# Patient Record
(demographics unavailable — no encounter records)

---

## 2024-10-11 NOTE — HISTORY OF PRESENT ILLNESS
[FreeTextEntry1] : Charo is a 35-year-old female who presents today for follow up for chronic exacerbated right clavicular pain particularly localized around the level of the AC joint.  The symptoms are bothersome and are affecting her ADLs.  She describes some relief with her injection last visit, however some symptoms persist.

## 2024-10-11 NOTE — PHYSICAL EXAM
[de-identified] : On examination of the right shoulder, there is tenderness at the level of the AC joint however no swelling or signs of infection. She tolerates range of motion well with minimal discomfort. [de-identified] : 3 views of right clavicle were reviewed today in my office and were seen by me and discussed with the patient.  These [show findings consistent with AC arthropathy]

## 2024-10-11 NOTE — ASSESSMENT
[FreeTextEntry1] : ASSESSMENT: The patient comes in today for follow up for chronic exacerbated right clavicular pain particularly localized around the level of the AC joint. She describes some relief with her injection last visit, however some symptoms persist. 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  Treatment modalities were discussed, the patient elects for a repeat injection as prior injection provided symptomatic relief.  I also recommend physical therapy for her condition.  Activity modifications were discussed as well.   The patient was adequately and thoroughly informed of my assessment of their current condition(s).  - This may diminish bodily function for the extremity.  We discussed prognosis, treatment modalities including operative and nonoperative options for the above diagnostic assessment. As always, 2nd opinion is always provided as an option. For this, when accessible, I was able to review other physicians note(s) including reviewing other tests, imaging results as well as personally view these results for my own interpretation.      Injection:   The risks and benefits of a steroid injection were discussed in detail. The risks include but are not limited to: pain, infection, swelling, flare response, bleeding, subcutaneous fat atrophy, skin depigmentation and/or elevation of blood sugar. The risk of incomplete resolution of symptoms, recurrence and additional intervention was reviewed and considered by the patient.  The patient agreed to proceed and under a sterile prep, I injected 1 unit (6mg) into 1 cc of a combination of Celestone and Lidocaine into the [right acromioclavicular joint]. The patient tolerated the injection well.   The patient was adequately and thoroughly informed of my assessment of their current condition(s).   DISCUSSION: 1.  Injection as above.  PT prescription provided.  Activity modifications. HEP. 2. [x] 3. [x]

## 2024-12-26 NOTE — PHYSICAL EXAM
[FreeTextEntry3] : Vascular Exam: DP 2/4 bilaterally, PT 2/4 bilaterally, Temperature gradient normal, Capillary return instant x 10 [de-identified] : Orthopedic Exam:  Patient presents with a hypermobile varus foot type.  As she bears weight her arch flattens.  Mild bunion and hammer toe deformities noted left greater than right.  Prominent metatarsal heads plantarly.  Discomfort upon palpation 2nd metatarsal head left. X-ray: 2 views demonstrate plantar heel spur left greater than right.  Bunion deformity.  Hammer toe deformity.  Increased soft tissue density plantar to the metatarsal heads left greater than right consistent with inflamation. [FreeTextEntry1] : Neurological Exam: Sharp / Dull - L: WNL. Sharp / Dull - R: WNL. Light Touch/pressure - L: WNL. Light Touch/pressure - R: WNL. Hot/cold - L: WNL. Hot/cold - R: WNL. Vibratory - L: WNL. Vibratory - R: WNL.

## 2024-12-26 NOTE — PHYSICAL EXAM
[FreeTextEntry3] : Vascular Exam: DP 2/4 bilaterally, PT 2/4 bilaterally, Temperature gradient normal, Capillary return instant x 10 [de-identified] : Orthopedic Exam:  Patient presents with a hypermobile varus foot type.  As she bears weight her arch flattens.  Mild bunion and hammer toe deformities noted left greater than right.  Prominent metatarsal heads plantarly.  Discomfort upon palpation 2nd metatarsal head left. X-ray: 2 views demonstrate plantar heel spur left greater than right.  Bunion deformity.  Hammer toe deformity.  Increased soft tissue density plantar to the metatarsal heads left greater than right consistent with inflamation. [FreeTextEntry1] : Neurological Exam: Sharp / Dull - L: WNL. Sharp / Dull - R: WNL. Light Touch/pressure - L: WNL. Light Touch/pressure - R: WNL. Hot/cold - L: WNL. Hot/cold - R: WNL. Vibratory - L: WNL. Vibratory - R: WNL.

## 2024-12-26 NOTE — PROCEDURE
[FreeTextEntry1] : Plan:  Examination.  (Hb=07904).  We had a lengthy discussion concerning the patient's condition.  We reviewed her x-rays. (Wy=[id number]).  We offered her an NSAID which she refused.  Surgical debridement of the hyperkeratosis revealed pink, healthy skin.  We discussed the benefits of custom foot orthosis. Patient to return: as needed.

## 2024-12-26 NOTE — PHYSICAL EXAM
[FreeTextEntry3] : Vascular Exam: DP 2/4 bilaterally, PT 2/4 bilaterally, Temperature gradient normal, Capillary return instant x 10 [de-identified] : Orthopedic Exam:  Patient presents with a hypermobile varus foot type.  As she bears weight her arch flattens.  Mild bunion and hammer toe deformities noted left greater than right.  Prominent metatarsal heads plantarly.  Discomfort upon palpation 2nd metatarsal head left. X-ray: 2 views demonstrate plantar heel spur left greater than right.  Bunion deformity.  Hammer toe deformity.  Increased soft tissue density plantar to the metatarsal heads left greater than right consistent with inflamation. [FreeTextEntry1] : Neurological Exam: Sharp / Dull - L: WNL. Sharp / Dull - R: WNL. Light Touch/pressure - L: WNL. Light Touch/pressure - R: WNL. Hot/cold - L: WNL. Hot/cold - R: WNL. Vibratory - L: WNL. Vibratory - R: WNL.

## 2024-12-26 NOTE — HISTORY OF PRESENT ILLNESS
[FreeTextEntry1] : Location/Duration: Charo is a 36-year-old female who presents to our office complaining of pain in both feet.  The left hurts more.  She gets ocassional pain over the bump on the inside of her foot, but recently started getting pain in the ball more often.  She feels a hard spot there.  Quality/Severity of Pain: The patient states that the pain is worsening.  Pain Scale Stated: 3/10.  Timing: The pain gets worse the more she is on her feet. Context: The patient has had no previous treatment. The patient states that there is not any injury associated with the problem. Modifying Factors: She indicates no modifying factors.

## 2024-12-26 NOTE — PROCEDURE
[FreeTextEntry1] : Plan:  Examination.  (Wj=[id number]).  We had a lengthy discussion concerning the patient's condition.  We reviewed her x-rays. (St=[id number]).  We offered her an NSAID which she refused.  Surgical debridement of the hyperkeratosis revealed pink, healthy skin.  We discussed the benefits of custom foot orthosis. Patient to return: as needed.

## 2024-12-26 NOTE — PROCEDURE
[FreeTextEntry1] : Plan:  Examination.  (Tf=[id number]).  We had a lengthy discussion concerning the patient's condition.  We reviewed her x-rays. (Fc=[id number]).  We offered her an NSAID which she refused.  Surgical debridement of the hyperkeratosis revealed pink, healthy skin.  We discussed the benefits of custom foot orthosis. Patient to return: as needed.

## 2024-12-30 NOTE — PHYSICAL EXAM
[de-identified] : Bilateral hands -Digits warm well-perfused -Denies numbness or tingling on the radial or ulnar border of digits distally in the median, ulnar and radial nerve distributions -Able to make a full composite fist that difficulty. -No tenderness of the A1 pulley of all digits, no clicking locking or catching -No subluxation of sagittal bands or tenderness of the sagittal bands -Maximum ttp of all digits at PIP joint -Left index finger has slight hyperextension deformity at PIP joint [de-identified] : Three-view x-ray of bilateral hand including AP lateral oblique were taken and personally viewed these demonstrate no fracture dislocation there is maintained joint space, there is no soft tissue lesions.

## 2024-12-30 NOTE — HISTORY OF PRESENT ILLNESS
[FreeTextEntry1] : 36-year-old female presenting for evaluation of generalized bilateral hand pain, most focal at her PIP joints of all digits, most painful of her left index finger.  This been ongoing for several weeks to months.  There is no history of injury or trauma.  She has seen a hand provider several years ago in the past.  She also  shoulder pain as well.  She reports generalized pain all the time.  She denies history of rheumatologic disorder.

## 2024-12-30 NOTE — ASSESSMENT
[FreeTextEntry1] : - 36-year-old female, generalized hand pain bilateral, focal at the PIP joints.  She denies history of any rheumatologic disorder or rheumatologic disorder in the family however she is not sure.  Given bilateral hand involvement multiple joints we have sent screening labs including CRP, ESR AMADOR and rheumatoid factor as this may be rheumatologic in nature -Believe she will benefit from hand therapy for strengthening stretching and pain modalities, hand therapy consult placed today. -She will follow-up after trialing hand therapy as well as obtaining the screening labs and we will make a plan going forward.

## 2025-02-03 NOTE — HISTORY OF PRESENT ILLNESS
[FreeTextEntry1] : Anti-obesity medications:  Obesity medication side effects: Bariatric surgery history: none  Obesity co-morbidities: dyslipidemia  Co-morbidities improved or resolved: highest adult weight: 330 last weight: current weight: 310 other pmhx:  labs: reviewed  cholecystectomy (a few weeks)  35 yo female with class 3 obesity presents for weight management follow up.  Comorbid conditions include dyslipidemia. Prior use of Ozempic but lost access to this (it did help with 60 lbs weight loss).  Written for Vyvanse by psychiatrist to help with BED, she tried this temporarily and this didn't really do anything for her/It made her anxiety worse.  Her insurance changed this month and it covers Zepbound with PA and she would like to start this    No contraindications to GLP therapy: history of pancreatitis, personal or family history of medullary thyroid cancer or MEN2 No history of severe GI disease, no h/o diabetic retinopathy, type 1 DM, concomitant use of insulin secretagogues or insulin Patient is not: pregnant or planning pregnancy, breastfeeding

## 2025-02-03 NOTE — REASON FOR VISIT
[Home] : at home, [unfilled] , at the time of the visit. [Other Location: e.g. Home (Enter Location, City,State)___] : at [unfilled] [Patient] : the patient [This encounter was initiated by telehealth (audio with video) and converted to telephone (audio only) due to technical difficulties.] : This encounter was initiated by telehealth (audio with video) and converted to telephone (audio only) due to technical difficulties. [Follow-Up] : a follow-up visit

## 2025-02-03 NOTE — ASSESSMENT
[FreeTextEntry1] : 35 yo female requires medical weight loss therapy due to weight history and the positive effects weight loss can have on comorbid conditions of dyslipidemia:   - She agrees to start Zepbound 2.5.  Risks and benefits discussion, including the possibility that it could make her BED worse.  - for her constipation she will monitor and manage with medications PRN - She will continue to follow with her psychiatrist  - she is not a candidate for vyvanse, contrave or qsymia due to h/o anxiety  - Follow up 3 weeks

## 2025-03-03 NOTE — HISTORY OF PRESENT ILLNESS
[FreeTextEntry1] : Anti-obesity medications:  Obesity medication side effects: Bariatric surgery history: none  Obesity co-morbidities: dyslipidemia  Co-morbidities improved or resolved: highest adult weight: 330 last weight: current weight: 310 other pmhx:  labs: reviewed  cholecystectomy (a few weeks)  37 yo female with class 3 obesity presents for weight management follow up.  Comorbid conditions include dyslipidemia. Prior use of Ozempic but lost access to this (it did help with 60 lbs weight loss).  Written for Vyvanse by psychiatrist to help with BED, she tried this temporarily and this didn't really do anything for her/It made her anxiety worse.  Her insurance changed this month and it covers Zepbound with PA and she would like to start this    No contraindications to GLP therapy: history of pancreatitis, personal or family history of medullary thyroid cancer or MEN2 No history of severe GI disease, no h/o diabetic retinopathy, type 1 DM, concomitant use of insulin secretagogues or insulin Patient is not: pregnant or planning pregnancy, breastfeeding

## 2025-03-03 NOTE — ASSESSMENT
[FreeTextEntry1] : 37 yo female with class 3 obesity requires medical weight loss therapy due to weight history and the positive effects weight loss can have on comorbid conditions of dyslipidemia:   - class 3 obesity: She agrees to start Zepbound 2.5.  Risks and benefits discussion, including the possibility that it could make her BED worse.  - for her constipation she will monitor and manage with medications PRN - She will continue to follow with her psychiatrist  - she is not a candidate for vyvanse, contrave or qsymia due to h/o anxiety  - Follow up 3 weeks

## 2025-03-07 NOTE — HISTORY OF PRESENT ILLNESS
[FreeTextEntry1] : Anti-obesity medications:  zepbound (Sw [facility],  3/1/25) Obesity medication side effects: Bariatric surgery history: none  Obesity co-morbidities: dyslipidemia  Co-morbidities improved or resolved: highest adult weight: 330 other pmhx:  labs: reviewed  cholecystectomy (a few weeks)  35 yo female with class 3 obesity presents for weight management follow up.  Comorbid conditions include dyslipidemia.  She also has a history of BED and follows with psychiatry.  She has approval for Zepbound but is only on it 1 week (feels crappy because she's sick).  It's hard to say if it's working well for her, mild nausea once or twice.  She had anxiety starting it so she delayed and she also had a death in the family and was away.    notes:  Prior use of Ozempic but lost access to this (it did help with 60 lbs weight loss).  Written for Vyvanse by psychiatrist to help with BED, she tried this temporarily and this didn't really do anything for her/It made her anxiety worse.  Her insurance changed this month and it covers Zepbound with PA and she would like to start this    No contraindications to GLP therapy: history of pancreatitis, personal or family history of medullary thyroid cancer or MEN2 No history of severe GI disease, no h/o diabetic retinopathy, type 1 DM, concomitant use of insulin secretagogues or insulin Patient is not: pregnant or planning pregnancy, breastfeeding

## 2025-03-07 NOTE — ASSESSMENT
[FreeTextEntry1] : 35 yo female with class 3 obesity requires medical weight loss therapy due to weight history and the positive effects weight loss can have on comorbid conditions of dyslipidemia:   + class 3 obesity:  She agrees to increase to Zep 5mg (assuming all continues to go well on the 2.5)   - she has zofran to use prn but didn't require it - for her constipation she will monitor and manage with medications PRN  + dyslipidemia: can monitor labs in the future  - She will continue to follow with her psychiatrist  - she is not a candidate for vyvanse, contrave or qsymia due to h/o anxiety  - Follow up 6 weeks

## 2025-05-08 NOTE — HISTORY OF PRESENT ILLNESS
[de-identified] :  Ms. PIZARRO is a 36 year old morbidly obese woman, 5ft 4.5 inch, 327 lbs, BMI 55.2.  The patient presents requesting weight loss surgery. She has been more than 75 lb. overweight for greater than 5 years.   The patient has tried numerous weight loss programs including self-directed and physician supervised diets and self-directed exercise programs. She has lost greater than 10 pounds on more than one occasion, however, has experienced weight regain despite her best efforts with healthy diet and exercise.  The patient's history includes cholecystectomy   denies reflux denies smoking

## 2025-05-08 NOTE — ASSESSMENT
[FreeTextEntry1] :  Ms. PIZARRO is a 36 year old woman with morbid obesity who is unable to lose weight and lower her risk of co-morbid conditions with medical management including diet and exercise. She wishes to proceed with planning for bariatric surgery.

## 2025-05-08 NOTE — PLAN
[FreeTextEntry1] : The risks, benefits and alternatives of laparoscopic/robotic gastric bypass and sleeve gastrectomy were discussed at length and all questions were answered. The patient appears to understand and wishes to proceed. Plan for robotic sleeve gastrectomy.   The patient was given the following instructions:  1.   She needs a complete medical evaluation including cardiac evaluation   2.   She needs an upper endoscopy. - Dr. Espinosa will preform   3.   She needs dietary and psychological evaluations.  The patient clearly understood that surgery would only be scheduled if there are no medical or psychiatric contraindications, and a second office visit is required.  The risks/benefits of weight loss surgery vs nonoperative management were discussed. We discussed the criteria used for eligibility. The patient understands.   I, Dr. Espinosa, personally performed the evaluation and management (E/M) services for this established patient who presents today with (a) new problem(s)/exacerbation of (an) existing condition(s).  That E/M includes conducting the clinically appropriate interval history &/or exam, assessing all new/exacerbated conditions, and establishing a new plan of care.  Today, my CRISTY, Caryn Valdes, was here to observe in the visit & follow plan of care established by me.

## 2025-06-01 NOTE — HISTORY OF PRESENT ILLNESS
[Poor Choices] : poor choices [Large Portions] : large portions [Emotional Eating] : emotional eating [Decrease Activity] : decrease activity [Mindless Eating] : mindless eating [de-identified] : Pt's motivation for surgery is to improve her health, overall quality of life and prevent obesity related comorbidities.  Per pt, her highest weight is current weight of 330 lbs and her lowest weight was 150 lbs 8 years ago.  Her stated goal weight is 200 lbs and she expresses intent to make behavioral and dietary changes towards obtaining optimal results. [de-identified] : discussions with surgeon; online research; discussions with others who've had bariatric surgery.  [de-identified] : sleeve gastrectomy   [de-identified] : Ms. Stearns denied a history of anorexia or bulimia. She endorsed engaging in binge eating episodes once a week, during which she eats a very large amount of food, usually pastries, within a 5-10 minute period. She stated that this caused her to gain 130 lbs in 1 year.   [de-identified] : A current typical day of eating is reported as follows: B: 8am [employer] coffee, lemon loaf slice, donut, or croissant L: 12-1pm Rice and chicken from [employer], sometimes with a brownie D: 7-8 pm Chicken, veggie, starch S: 11 pm snack cakes, cookies, oreos  Drinks: Water, diet soda [de-identified] : counting calories, reducing calories, ozempic, exercising, zepbound, high protein diet. Despite multiple attempts at diet and exercise modifications, patient reports inability to maintain weight loss.

## 2025-06-01 NOTE — REASON FOR VISIT
[Other Location: e.g. School (Enter Location, City,State)___] : at [unfilled], at the time of the visit. [Other Location: e.g. Home (Enter Location, City,State)___] : at [unfilled] [Telehealth (audio & video)] : This visit was provided via telehealth using real-time 2-way audio visual technology. [Verbal consent obtained from patient] : the patient, [unfilled] [Initial Consult] : an initial consult for [Referring By:  ___] : ~Max Ln~ was referred for psychological evaluation by Dr. KAUR [Attempted Weight Loss] : attempted weight loss [Commitment to Modified Lifestyle] : commitment to a modified lifestyle pre and post surgery [Difficulties with Diet Compliance] : difficulties with diet compliance  [Expectations of Outcome] : expectations of outcome [Motivation for Selecting Surgery] : motivation for selecting surgery [Strength of Social Support System] : strength of social support system [Patient Understands Data May be Shared] : patient understands that the information discussed during the evaluation would be shared with referring provider and possibly with ~his/her~ insurance provider [de-identified] : for evaluation of psychological appropriateness for bariatric surgery      [de-identified] : confidentiality and its limits were discussed

## 2025-06-01 NOTE — SOCIAL HISTORY
[Never ] : never  [Committed Relationship] : in a committed relationship [FreeTextEntry1] : Lives with her boyfriend [FreeTextEntry2] : Employed full time as a phlebotomist [FreeTextEntry4] : Some college [FreeTextEntry5] : Pt reports experiencing family members with alcoholism and mental health issues that resulted in traumatic experiences.

## 2025-06-01 NOTE — DISCUSSION/SUMMARY
[FreeTextEntry1] : Due to time limitations, this evaluation was not able to be completed and a second session was scheduled for 6/12.

## 2025-06-01 NOTE — HISTORY OF PRESENT ILLNESS
[Poor Choices] : poor choices [Large Portions] : large portions [Emotional Eating] : emotional eating [Decrease Activity] : decrease activity [Mindless Eating] : mindless eating [de-identified] : Pt's motivation for surgery is to improve her health, overall quality of life and prevent obesity related comorbidities.  Per pt, her highest weight is current weight of 330 lbs and her lowest weight was 150 lbs 8 years ago.  Her stated goal weight is 200 lbs and she expresses intent to make behavioral and dietary changes towards obtaining optimal results. [de-identified] : discussions with surgeon; online research; discussions with others who've had bariatric surgery.  [de-identified] : sleeve gastrectomy   [de-identified] : Ms. Stearns denied a history of anorexia or bulimia. She endorsed engaging in binge eating episodes once a week, during which she eats a very large amount of food, usually pastries, within a 5-10 minute period. She stated that this caused her to gain 130 lbs in 1 year.   [de-identified] : A current typical day of eating is reported as follows: B: 8am [employer] coffee, lemon loaf slice, donut, or croissant L: 12-1pm Rice and chicken from [employer], sometimes with a brownie D: 7-8 pm Chicken, veggie, starch S: 11 pm snack cakes, cookies, oreos  Drinks: Water, diet soda [de-identified] : counting calories, reducing calories, ozempic, exercising, zepbound, high protein diet. Despite multiple attempts at diet and exercise modifications, patient reports inability to maintain weight loss.

## 2025-06-01 NOTE — REASON FOR VISIT
[Other Location: e.g. School (Enter Location, City,State)___] : at [unfilled], at the time of the visit. [Other Location: e.g. Home (Enter Location, City,State)___] : at [unfilled] [Telehealth (audio & video)] : This visit was provided via telehealth using real-time 2-way audio visual technology. [Verbal consent obtained from patient] : the patient, [unfilled] [Initial Consult] : an initial consult for [Referring By:  ___] : ~Max Ln~ was referred for psychological evaluation by Dr. KAUR [Attempted Weight Loss] : attempted weight loss [Commitment to Modified Lifestyle] : commitment to a modified lifestyle pre and post surgery [Difficulties with Diet Compliance] : difficulties with diet compliance  [Expectations of Outcome] : expectations of outcome [Motivation for Selecting Surgery] : motivation for selecting surgery [Strength of Social Support System] : strength of social support system [Patient Understands Data May be Shared] : patient understands that the information discussed during the evaluation would be shared with referring provider and possibly with ~his/her~ insurance provider [de-identified] : for evaluation of psychological appropriateness for bariatric surgery      [de-identified] : confidentiality and its limits were discussed

## 2025-07-01 NOTE — HISTORY OF PRESENT ILLNESS
[FreeTextEntry1] : annual well check and to establish care w/ a new PM [de-identified] : -PMH: Morbid Obesity, Anxiety w/ Panic DO -SH: Non-smoker. Occasional EtOH use.   LUZ is a 36 year F whom is here today for an annual well check and to establish care w/ a new PMD Today, pt reports feeling well  Specialists Involved: -Psychiatry -OBGYN: Dr. Gavin  -Cardio: Dr. Whitehead [phone]  -Vaccines: Needs Flu, TDap states she has a record of last Tdap at home -Pap Smear: 12/2022 -FH of Colon, breast or ovarian CA: Maternal GM w/ Breast CA @ 30.   -Anxiety, Panic DO: Managed on Fluoxetine 60mg qd per Psychiatry  - Morbid obesity: States longstanding history of difficulties with weight.  Previously did very well on Ozempic but since being off medication has regained all lost weight.  States previously tolerated medication well

## 2025-07-01 NOTE — HISTORY OF PRESENT ILLNESS
[FreeTextEntry1] : annual well check and to establish care w/ a new PM [de-identified] : -PMH: Morbid Obesity, Anxiety w/ Panic DO -SH: Non-smoker. Occasional EtOH use.   LUZ is a 36 year F whom is here today for an annual well check and to establish care w/ a new PMD Today, pt reports feeling well  Specialists Involved: -Psychiatry -OBGYN: Dr. Gavin  -Cardio: Dr. Whitehead [phone]  -Vaccines: Needs Flu, TDap states she has a record of last Tdap at home -Pap Smear: 12/2022 -FH of Colon, breast or ovarian CA: Maternal GM w/ Breast CA @ 30.   -Anxiety, Panic DO: Managed on Fluoxetine 60mg qd per Psychiatry  - Morbid obesity: States longstanding history of difficulties with weight.  Previously did very well on Ozempic but since being off medication has regained all lost weight.  States previously tolerated medication well

## 2025-07-01 NOTE — HEALTH RISK ASSESSMENT
[Excellent] : ~his/her~  mood as  excellent [No] : In the past 12 months have you used drugs other than those required for medical reasons? No [0] : 2) Feeling down, depressed, or hopeless: Not at all (0) [PHQ-2 Negative - No further assessment needed] : PHQ-2 Negative - No further assessment needed [Patient reported PAP Smear was normal] : Patient reported PAP Smear was normal [HIV test declined] : HIV test declined [Hepatitis C test declined] : Hepatitis C test declined [Employed] : employed [Single] : single [Reviewed no changes] : Reviewed, no changes [Never] : Never [Audit-CScore] : 0 [[id number]] : 0 [Change in mental status noted] : No change in mental status noted [PapSmearDate] : 12/22 [AdvancecareDate] : 06/25

## 2025-07-01 NOTE — HEALTH RISK ASSESSMENT
[Excellent] : ~his/her~  mood as  excellent [No] : In the past 12 months have you used drugs other than those required for medical reasons? No [0] : 2) Feeling down, depressed, or hopeless: Not at all (0) [PHQ-2 Negative - No further assessment needed] : PHQ-2 Negative - No further assessment needed [Patient reported PAP Smear was normal] : Patient reported PAP Smear was normal [HIV test declined] : HIV test declined [Hepatitis C test declined] : Hepatitis C test declined [Employed] : employed [Single] : single [Reviewed no changes] : Reviewed, no changes [Never] : Never [Audit-CScore] : 0 [[id number]] : 0 [Change in mental status noted] : No change in mental status noted [PapSmearDate] : 12/22 [AdvancecareDate] : 06/25

## 2025-07-01 NOTE — ASSESSMENT
[Vaccines Reviewed] : Immunizations reviewed today. Please see immunization details in the vaccine log within the immunization flowsheet.  [FreeTextEntry1] : Slightly elevated diastolic BP and elevated BMI consistent with morbid obesity Dietary/lifestyle modifications with increased exercise and goal of weight loss encouraged Will start patient on Zepbound 2.5 mg q. weekly subcutaneously.  She will return to office in 2 months sooner if needed.  Most common side effects discussed and should she have any undesired adverse effects she will call office immediately for further instruction. Mediterranean diet preferred Low-salt diet and close monitoring of her blood pressure advised She will send us records of last Tdap vaccine Records from cardiologist requested given slightly irregular EKG with a nonspecific T wave inversion in lead III noted on EKG done in office today otherwise sinus rhythm. Continue on current medication per psychiatry Continue annual screening with gynecology for breast and cervical cancer screening Follow-up labs drawn in office today with further recommendations to come pending results

## 2025-07-01 NOTE — ADDENDUM
[FreeTextEntry1] : Patient is deficient in both vitamin B12 and folic acid Recommend she return to office for q. weekly B12 subcutaneous injections x 4 followed by maintenance with over-the-counter oral B12 500 mcg daily Start folic acid prescription 1 mg daily Vitamin D deficiency.  Start vitamin D 50,000 IU q. weekly x 8 weeks followed by maintenance with vitamin D3 OTC 2000 IU daily Please advise patient to return to office in 2 months for follow-up and repeat labs